# Patient Record
Sex: MALE | Race: WHITE | Employment: FULL TIME | URBAN - METROPOLITAN AREA
[De-identification: names, ages, dates, MRNs, and addresses within clinical notes are randomized per-mention and may not be internally consistent; named-entity substitution may affect disease eponyms.]

---

## 2017-03-10 ENCOUNTER — HOSPITAL ENCOUNTER (OUTPATIENT)
Dept: CT IMAGING | Age: 57
Discharge: HOME | End: 2017-03-10
Payer: SELF-PAY

## 2017-03-10 DIAGNOSIS — Z13.6 SCREENING FOR ISCHEMIC HEART DISEASE: ICD-10-CM

## 2017-03-10 PROCEDURE — 75571 CT HRT W/O DYE W/CA TEST: CPT

## 2017-03-15 ENCOUNTER — TELEPHONE (OUTPATIENT)
Dept: CARDIAC REHAB | Age: 57
End: 2017-03-15

## 2017-03-16 ENCOUNTER — TELEPHONE (OUTPATIENT)
Dept: CARDIAC REHAB | Age: 57
End: 2017-03-16

## 2017-03-16 NOTE — TELEPHONE ENCOUNTER
Patient returned the call that was left for him yesterday to review his CT scan results. Verified date of birth. Discussed results. His calcium score is 13. This corresponds to a small amount of plaque noted in the coronary arteries. His risk for a heart attack is  low. It is reccommended that he  follow up with his PCP to make sure that any and all risk factors are being optimally managed. Patient verbalized understanding of results. Will fax report to his PCP, Roula Avila.

## 2019-04-04 ENCOUNTER — OFFICE VISIT (OUTPATIENT)
Dept: INTERNAL MEDICINE CLINIC | Age: 59
End: 2019-04-04

## 2019-04-04 VITALS
SYSTOLIC BLOOD PRESSURE: 112 MMHG | WEIGHT: 176 LBS | BODY MASS INDEX: 25.2 KG/M2 | TEMPERATURE: 98.2 F | OXYGEN SATURATION: 96 % | DIASTOLIC BLOOD PRESSURE: 64 MMHG | HEIGHT: 70 IN | RESPIRATION RATE: 18 BRPM | HEART RATE: 67 BPM

## 2019-04-04 DIAGNOSIS — R94.01 ABNORMAL EEG: ICD-10-CM

## 2019-04-04 DIAGNOSIS — G90.9 AUTONOMIC DYSFUNCTION: Primary | ICD-10-CM

## 2019-04-04 DIAGNOSIS — I95.1 SYNCOPE DUE TO ORTHOSTATIC HYPOTENSION: ICD-10-CM

## 2019-04-04 DIAGNOSIS — I77.810 AORTIC ROOT DILATATION (HCC): ICD-10-CM

## 2019-04-04 RX ORDER — ASPIRIN 81 MG/1
TABLET ORAL DAILY
COMMUNITY

## 2019-04-04 RX ORDER — LAMOTRIGINE 150 MG/1
TABLET ORAL DAILY
COMMUNITY

## 2019-04-04 NOTE — PROGRESS NOTES
Chief Complaint Patient presents with  Physical  
 
1. Have you been to the ER, urgent care clinic since your last visit? Hospitalized since your last visit? No 
 
2. Have you seen or consulted any other health care providers outside of the [address] [place] [address] since your last visit? Include any pap smears or colon screening.  No

## 2019-04-04 NOTE — PATIENT INSTRUCTIONS
Autonomic Dysfunction>>? Autoimmune>>Dr. Gutierrez's workup being completed>>continue high salt and increased fluid intake along with compression hose Abnormal EEG with normal MRI and MRA>>consider discontinuation once diagnosis of autonomic neuropathy confirmed but will need to coordinate with Dr. Shayan Estevez Screening Services:    Need to arrange PSA, fasting lipid Determine date of last colonoscopy Shingrix discussed

## 2019-04-04 NOTE — PROGRESS NOTES
HPI:  
 
This patient presents to the office for his annual wellness visit without any records from [facility] forwarded for review. I reviewed information from [facility] and populated this into EMR. His medications were reconciled. This will serve as his preventive health service visit. I referred him to Dr. Medina Lucero because of recurrent near syncope that was initially ascribed to seizure. He had an abnormal EEG but negative MRI and MRA. Subsequent EEG was negative. He was started on Markus Mercury which he did not tolerate well and since switched to Lamictal. He did not feel that the diagnosis was secure and wanted to explore alternative explanation. He had a +TTS followed by a battery of testing (laboratory, EMG/NCV) and he is scheduled for lip biopsy and Qsart. In the interim, he was asked to wear compression stockings, and liberalize sodium and fluid intake. He was asked to keep HOB elevated at 45 while asleep as an additional measure. He had CTA heart with findings of mild nonobstructive epicardial coronary artery disease and mildly dilated aortic root measuring 3.7 cm at level of sinus of valsalva. There is a stable 5 mm subpleural nodule in the LLL that is \"stable and does not require dedicated follow up\" He had emailed me prior to this visit about concerns for MSA and I told him that he did not have the features characteristic of this diagnosis. Past Medical History:  
Diagnosis Date  Abnormal EEG 4/4/2019 Negative MRI and MRA  Autonomic dysfunction 4/4/2019  Syncope due to orthostatic hypotension 4/4/2019  
 + TTS History reviewed. No pertinent surgical history. Current Outpatient Medications Medication Sig  lamoTRIgine (LAMICTAL) 150 mg tablet Take  by mouth daily.  aspirin delayed-release 81 mg tablet Take  by mouth daily. No current facility-administered medications for this visit. Allergies and Intolerances: Allergies no known allergies Family History: Family History Problem Relation Age of Onset  No Known Problems Mother  Heart Disease Father Social History: He  reports that he has never smoked. He has never used smokeless tobacco.  
Social History Substance and Sexual Activity Alcohol Use Yes Comment: socially Immunization History:            Need confirmation Diet:                                        High sodium Exercise:                                Weight Training and Aerobic Review of Systems Constitutional: Negative for chills, fever and weight loss. HENT: Negative for hearing loss. Eyes: Negative for blurred vision. Respiratory: Negative for cough, shortness of breath and wheezing. Cardiovascular: Negative for chest pain, palpitations and leg swelling. Gastrointestinal: Negative for abdominal pain, blood in stool and heartburn. Genitourinary: Negative for dysuria and urgency. Musculoskeletal: Negative for joint pain. Skin: Negative for rash. Neurological: Positive for dizziness. Negative for tingling and headaches. Endo/Heme/Allergies: Does not bruise/bleed easily. Psychiatric/Behavioral: Negative for depression. The patient does not have insomnia. Physical:  
Visit Vitals /64 (BP 1 Location: Left arm, BP Patient Position: Sitting) Pulse 67 Temp 98.2 °F (36.8 °C) (Oral) Resp 18 Ht 5' 10\" (1.778 m) Wt 176 lb (79.8 kg) SpO2 96% BMI 25.25 kg/m² BP standing 132/74 Physical Exam  
Constitutional: He is well-developed, well-nourished, and in no distress. HENT:  
Nose: Nose normal.  
Mouth/Throat: Oropharynx is clear and moist. No oropharyngeal exudate. Eyes: Conjunctivae are normal. No scleral icterus. Neck: Normal range of motion. No JVD present. Cardiovascular: Normal rate, regular rhythm, normal heart sounds and intact distal pulses. Exam reveals no gallop. No murmur heard. Pulmonary/Chest: Breath sounds normal. He has no wheezes. He has no rales. Abdominal: Soft. He exhibits no mass. There is no tenderness. Musculoskeletal: He exhibits no edema or deformity. Lymphadenopathy:  
  He has no cervical adenopathy. Skin: Skin is warm. Vitals reviewed. Review of Data:   Lianne queried Labs: Impression/Plan: 
 Patient Active Problem List  
Diagnosis  Code  Autonomic dysfunction with suspicion for autoimmune etiology Awaiting completion of workup by Dr. Gardenia Merrill G90.9  Syncope secondary to above Increased fluid and sodium intake along with compression stockings I95.1 Abnormal EEG with negative MRI/MRA on AED with negative repeat EEG Obtain records from Dr. Sharif Cabral for review and advised not to stop AED abruptly R94.01  
 Aortic Root Dilatation (3.7 cm) Avoid heavy weight training, repeat echocardiogram one year I77.810 Sean Pinon MD